# Patient Record
(demographics unavailable — no encounter records)

---
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
## 2024-10-23 NOTE — CONSULT LETTER
[Dear  ___] : Dear  [unfilled], [Consult Letter:] : I had the pleasure of evaluating your patient, [unfilled]. [Please see my note below.] : Please see my note below. [Consult Closing:] : Thank you very much for allowing me to participate in the care of this patient.  If you have any questions, please do not hesitate to contact me. [Sincerely,] : Sincerely, [FreeTextEntry3] : Brenda Clemons MD Child Neurologist [address], [address] [place] [address] Phone: [phone]

## 2024-10-23 NOTE — PLAN
[FreeTextEntry1] : We discussed Jhon's diagnosis of autism and he typical work up for autism including genetic testing Mom declined genetic testing at this time We discussed that his developmental delay and autism would not be explained by the fall he had when he was younger.  Due to mom's interest in neuroimaging, we discussed utility of MRI brain and need for anesthesia.  She would like to proceed with MRI brain Behavior is stable at this time, no need for behavioral medication